# Patient Record
(demographics unavailable — no encounter records)

---

## 2025-07-25 NOTE — ASSESSMENT
[FreeTextEntry1] : 3 y/o F with thumb contusion s/p door jam  XR negative for fracture  reassurance provided to parents ice/rest/children's motrin PRN follow up with Dr. figueroa in 7 days

## 2025-07-25 NOTE — HISTORY OF PRESENT ILLNESS
[de-identified] : 07/25/2025 :KAMILLA TAMIKAMONTSERRAT , a 3 year old female, presents today for left thumb pain injured when she mashed her finger in a door

## 2025-07-25 NOTE — HISTORY OF PRESENT ILLNESS
[de-identified] : 07/25/2025 :KAMILLA TAMIKAMONTSERRAT , a 3 year old female, presents today for left thumb pain injured when she mashed her finger in a door